# Patient Record
Sex: FEMALE | Race: WHITE | NOT HISPANIC OR LATINO | Employment: FULL TIME | URBAN - METROPOLITAN AREA
[De-identification: names, ages, dates, MRNs, and addresses within clinical notes are randomized per-mention and may not be internally consistent; named-entity substitution may affect disease eponyms.]

---

## 2018-03-29 DIAGNOSIS — O99.212 OBESITY AFFECTING PREGNANCY IN SECOND TRIMESTER: ICD-10-CM

## 2018-03-29 DIAGNOSIS — O09.899 HISTORY OF PRETERM DELIVERY, CURRENTLY PREGNANT: ICD-10-CM

## 2018-03-29 DIAGNOSIS — Z36.89 ENCOUNTER FOR FETAL ANATOMIC SURVEY: Primary | ICD-10-CM

## 2018-04-24 ENCOUNTER — OFFICE VISIT (OUTPATIENT)
Dept: MATERNAL FETAL MEDICINE | Facility: CLINIC | Age: 28
End: 2018-04-24
Payer: OTHER GOVERNMENT

## 2018-04-24 VITALS
BODY MASS INDEX: 45.99 KG/M2 | DIASTOLIC BLOOD PRESSURE: 86 MMHG | SYSTOLIC BLOOD PRESSURE: 132 MMHG | HEIGHT: 67 IN | WEIGHT: 293 LBS

## 2018-04-24 DIAGNOSIS — O09.212 PRIOR PRETERM LABOR IN SECOND TRIMESTER, ANTEPARTUM: ICD-10-CM

## 2018-04-24 DIAGNOSIS — O99.212 OBESITY AFFECTING PREGNANCY IN SECOND TRIMESTER: ICD-10-CM

## 2018-04-24 DIAGNOSIS — Z36.89 ENCOUNTER FOR FETAL ANATOMIC SURVEY: ICD-10-CM

## 2018-04-24 DIAGNOSIS — O09.899 HISTORY OF PRETERM DELIVERY, CURRENTLY PREGNANT: ICD-10-CM

## 2018-04-24 PROCEDURE — 76805 OB US >/= 14 WKS SNGL FETUS: CPT | Mod: ,,, | Performed by: OBSTETRICS & GYNECOLOGY

## 2018-04-24 PROCEDURE — 76817 TRANSVAGINAL US OBSTETRIC: CPT | Mod: ,,, | Performed by: OBSTETRICS & GYNECOLOGY

## 2018-04-24 PROCEDURE — 99204 OFFICE O/P NEW MOD 45 MIN: CPT | Mod: 25,,, | Performed by: OBSTETRICS & GYNECOLOGY

## 2018-04-24 NOTE — LETTER
April 24, 2018      Adelina Garcia CNM  [address] [facility]  [facility] [facility]  [place] [address]           [place] [facility]  [address], [address]  [place] [address]  Phone: [phone]          Patient: Sujey Rincon   MR Number: [id number]   YOB: 1990   Date of Visit: 4/24/2018       Dear Adelina Garcia:    Thank you for referring Sujey Rincon to me for evaluation. Attached you will find relevant portions of my assessment and plan of care.    If you have questions, please do not hesitate to call me. I look forward to following Sujey Rincon along with you.    Sincerely,    Gm So MD    Enclosure  CC:  No Recipients    If you would like to receive this communication electronically, please contact [email or web address] or [phone] to request more information on [employer] Link access.    For providers and/or their staff who would like to refer a patient to [facility], please contact us through our one-stop-shop provider referral line, [facility], at [phone].    If you feel you have received this communication in error or would no longer like to receive these types of communications, please e-mail [email or web address]

## 2018-04-24 NOTE — PROGRESS NOTES
Chief complaint: Prior  birth    Provider requesting consultation: LELO Garcia CNM-KAF[employer]    27 y.o. [id number] 16w0d EGA    PMH:  Past Medical History:   Diagnosis Date    Gastric ulcer     Hypertension     Obesity     PCOS (polycystic ovarian syndrome)     Thyroid disease        PObHx:  OB History    Para Term  AB Living   2 1 0 1 0 1   SAB TAB Ectopic Multiple Live Births   0 0 0 0 1      # Outcome Date GA Lbr Pravin/2nd Weight Sex Delivery Anes PTL Lv   2 Current            1  09/17/15 33w0d  2.693 kg (5 lb 15 oz) F CS-Unspec   DAVEY      Complications: PROM (premature rupture of membranes)          PSH:  Past Surgical History:   Procedure Laterality Date     SECTION         Family history:family history is not on file.    Social history: reports that she has never smoked. She has never used smokeless tobacco. She reports that she does not drink alcohol or use drugs.    A detailed fetal anatomical ultrasound was completed today.  See details in imaging section of EPIC.      Prior spontaneous  birth counseling and recommendations:    The patient gives a history of PPROM at 33 weeks with delivery by C/S 5 days later.   I discussed with the patient her prior history of  delivery.  I reviewed with her the increased risk of recurrence of  delivery in women who have a history of a prior spontaneous  birth.  I discussed current ACOG guidelines that recommend in patients with a prior history of spontaneous  birth they be offered progesterone therapy for the prevention of recurrent  birth.  This is usually accomplished via weekly 250mg intramuscular injections of 17-alpha-hydroxyprogesterone caproate beginning at 16-20 weeks estimated gestational age and continuing until 37 weeks.  (I anticipate this can be arranged by her primary obstetrical provider.)      Note is made of a family history (sister) of severe PP depression and psychosis that has  persisted years after delivery.  She was initially treated with progesterone for the 1st trimester 2/2 a history of multiple miscarriages.  For this reason, the patient is somewhat reticent of taking progesterone although she has already had a pregnancy with no history of PP depression.  I discussed this with the patient.  Progesterone is the hormone that is associated with mood depression, but her sister only took this in the  and it is likely the pregnancy and not the progesterone associated with her depressive psychosis.      In addition, her sister had a w/u for multiple miscarriages after which she was treated with Lovenox, raising the possibility of a genetic thrombophilia.  I have asked her to get a copy of that workup to determine whether there is an indication for a thrombophilia work up.  There is a strong family history on the maternal tere of early MI and strokes.      I also discussed with the patient that in women at risk for recurrent  birth, surveillance for cervical length shortening with periodic cervical length measurements may help identify those in need for tocolytic therapy and administration of corticosteroids.  Here at [facility] our MFM group recommends to measure the cervix every 2-3 weeks transvaginally starting at 16-18 weeks gestation up until 28 weeks gestation.     Recommendations from our MFM group at [facility]:    -Weekly progesterone injection therapy be initiated by her primary obstetrician at 16-20 weeks gestation until 37 weeks gestation    -Cervical length surveillance every 2-3 weeks starting at 16-18 weeks gestation up until 28 weeks gestation    With your permission we have taken the liberty today to schedule a follow up appointment for cervical length surveillance.       The patient was given an opportunity to ask questions about management and the diease process.  She expressed an understanding of and agreement to the above impression and plan. All questions  were answered to her satisfaction.  She was given contact information to the [facility] clinic to address further concerns.      The approximate physician face-to-face time was 45 minutes. The majority of the time (>50%) was spent on counseling of the patient or coordination of care.

## 2018-05-10 ENCOUNTER — OFFICE VISIT (OUTPATIENT)
Dept: MATERNAL FETAL MEDICINE | Facility: CLINIC | Age: 28
End: 2018-05-10
Payer: OTHER GOVERNMENT

## 2018-05-10 VITALS — DIASTOLIC BLOOD PRESSURE: 80 MMHG | SYSTOLIC BLOOD PRESSURE: 142 MMHG | BODY MASS INDEX: 46.83 KG/M2 | WEIGHT: 293 LBS

## 2018-05-10 DIAGNOSIS — O09.899 HISTORY OF PRETERM DELIVERY, CURRENTLY PREGNANT: ICD-10-CM

## 2018-05-10 PROCEDURE — 76817 TRANSVAGINAL US OBSTETRIC: CPT | Mod: ,,, | Performed by: OBSTETRICS & GYNECOLOGY

## 2018-05-10 PROCEDURE — 76815 OB US LIMITED FETUS(S): CPT | Mod: ,,, | Performed by: OBSTETRICS & GYNECOLOGY

## 2018-05-10 PROCEDURE — 99499 UNLISTED E&M SERVICE: CPT | Mod: ,,, | Performed by: OBSTETRICS & GYNECOLOGY

## 2018-05-10 NOTE — PROGRESS NOTES
TeleMed Only - [place]  IMANI 10/9/18; 18w2d    Dr. So's last note reviewed - it appears that pt may be reluctant to take the weekly 17-P because her sister was on 17-P and her sister  developed PP depression and Psychosis. Dr. So is waiting to receive her sister's medical records    OB HX   - Makenna; -15; C/S after PROM at 33 weeks.    Impression  =========  Normal cervical length,  Normal amniotic fluid volume.    Recommendation  ==============  Continue follow up scan as previously outlined.

## 2018-05-24 ENCOUNTER — OFFICE VISIT (OUTPATIENT)
Dept: MATERNAL FETAL MEDICINE | Facility: CLINIC | Age: 28
End: 2018-05-24
Payer: OTHER GOVERNMENT

## 2018-05-24 VITALS — WEIGHT: 293 LBS | SYSTOLIC BLOOD PRESSURE: 140 MMHG | DIASTOLIC BLOOD PRESSURE: 75 MMHG | BODY MASS INDEX: 47.46 KG/M2

## 2018-05-24 DIAGNOSIS — O99.212 OBESITY AFFECTING PREGNANCY IN SECOND TRIMESTER: ICD-10-CM

## 2018-05-24 DIAGNOSIS — O09.899 HISTORY OF PRETERM DELIVERY, CURRENTLY PREGNANT: ICD-10-CM

## 2018-05-24 PROCEDURE — 76811 OB US DETAILED SNGL FETUS: CPT | Mod: ,,, | Performed by: OBSTETRICS & GYNECOLOGY

## 2018-05-24 PROCEDURE — 99214 OFFICE O/P EST MOD 30 MIN: CPT | Mod: 25,,, | Performed by: OBSTETRICS & GYNECOLOGY

## 2018-05-24 PROCEDURE — 76817 TRANSVAGINAL US OBSTETRIC: CPT | Mod: ,,, | Performed by: OBSTETRICS & GYNECOLOGY

## 2018-05-24 RX ORDER — LABETALOL 100 MG/1
100 TABLET, FILM COATED ORAL 2 TIMES DAILY
COMMUNITY

## 2018-05-24 RX ORDER — HYDROXYPROGESTERONE CAPROATE 250 MG/ML
250 INJECTION INTRAMUSCULAR
COMMUNITY

## 2018-05-24 NOTE — PROGRESS NOTES
"Indication  ========    F/U Consultation. Fetal anatomy survey. History of incompetent cervix.    History  ======    General History  Blood group: B  Rhesus: Rh positive  Smoking: no  Height 170 cm  Height (ft) 5 ft  Height (in) 7 in  Medical History  Past surgical history: Previous surgeries performed  Surgery:  section  Previous Outcomes  Preg. no. 1  Outcome: Spontaneous miscarriage  Gest. age 4 w + 0 d  Details: 2008  Preg. no. 2  Outcome: Live YOB: 2017  Gest. age 33 w + 0 d  Gender: female  Details: PPROM 32wks c/s 5lb5oz "Makenna"   3  Para 1  Rubin children born (P) 1  Abortions (A) 1  Rubin living children (L) 1  Rubin children born living (P) 1  Miscarriages 1  Risk Factors  History risk factors:  delivery in previous pregnancy  Details: 33wks  History risk factors: Personal history of hypertension  History risk factors: Obesity  Details: Thyroid Disease  Details: Gastric Ulcer  Details: PCOS    Pregnancy History  ==============    Maternal Lab Tests  Result: integrated part 2 negative DSR 1:10,000 (age DSR 1:746).  Wants to know gender: yes    Maternal Assessment  =================    Weight 137 kg  Weight (lb) 303 lb  Height 170 cm  Height (ft) 5 ft  Height (in) 7 in  BP syst 140 mmHg  BP diast 75 mmHg  BMI 47.46 kg/m²    Method  ======    Transabdominal and transvaginal ultrasound examination, 2D Color Doppler, Will Epiq 7. View: Suboptimal view: limited by maternal body  habitus. Suboptimal view: limited by fetal position.    Pregnancy  =========    Rubin pregnancy. Number of fetuses: 1.    Dating  ======    Cycle: regular cycle  GA by "stated dating" 20 w + 2 d  IMANI by "stated dating": 10/9/2018  Ultrasound examination on: 2018  GA by U/S based upon: AC, BPD, Femur, HC  GA by U/S 19 w + 3 d  IMANI by U/S: 10/15/2018  Assigned: Dating performed on 2018, based on the external assessment  Assigned GA 20 w + 2 d  Assigned " IMANI: 10/9/2018    General Evaluation  ==============    Cardiac activity: present.  bpm.  Fetal movements: visualized.  Presentation: breech.  Placenta:  Placental site: anterior. Distance from internal cervical os 24 mm.  Umbilical cord: Cord vessels: 3 vessel cord. Cord insertion: placental insertion: suboptimal.  Amniotic fluid: normal amount.    Fetal Biometry  ============    Fetal Biometry  BPD 44.0 mm 19w 2d Hadlock  OFD 58.5 mm 20w 3d Kelton  .8 mm 19w 1d Hadlock  .0 mm 19w 6d Hadlock  Femur 30.7 mm 19w 4d Hadlock  Cerebellum tr 20.5 mm 20w 2d Pagan  CM 6.1 mm  Humerus 29.6 mm 19w 5d Kelton   g 13% Adolfo  Calculated by: Hadlock (BPD-HC-AC-FL)  EFW (lb) 0 lb  EFW (oz) 11 oz  Cephalic index 0.75  HC / AC 1.14  FL / BPD 0.70  FL / AC 0.21   bpm    Fetal Anatomy  ===========    Cranium: normal  Lateral ventricles: normal  Choroid plexus: normal  Midline falx: normal  Cavum septi pellucidi: normal  Cerebellum: normal  Cisterna magna: normal  Head shape: normal  Rt lateral ventricle: normal  Lt lateral ventricle: normal  Rt choroid plexus: normal  Lt choroid plexus: normal  Parenchyma: normal  Third ventricle: normal  Posterior fossa: normal  Cerebellar lobes: normal  Vermis: normal  Neck: suboptimal  Lips: suboptimal  Profile: normal  Nose: suboptimal  Maxilla: normal  Mandible: normal  4-chamber view: normal  RVOT: suboptimal  LVOT: normal  Heart / Thorax: Septal views: suboptimal  Situs: normal  Aortic arch: suboptimal  Ductal arch: normal  SVC: normal  IVC: normal  3-vessel view: suboptimal  3-vessel-trachea view: suboptimal  Cardiac position: normal  Cardiac axis: normal  Cardiac size: normal  Cardiac rhythm: normal  Rt lung: normal  Lt lung: normal  Diaphragm: suboptimal  Cord insertion: normal  Stomach: normal  Kidneys: normal  Bladder: normal  Genitals: normal  Abdom. wall: appears normal  Abdom. cavity: normal  Rt kidney: normal  Lt  kidney: normal  Liver: normal  Bowel: normal  Rt renal artery: suboptimal  Lt renal artery: suboptimal  Cervical spine: suboptimal  Thoracic spine: suboptimal  Lumbar spine: suboptimal  Sacral spine: suboptimal  Arms: normal  Legs: normal  Rt arm: normal  Lt arm: normal  Rt hand: present  Lt hand: present  Rt upper leg: suboptimal  Rt foot: suboptimal  Lt upper leg: normal  Lt lower leg: suboptimal  Lt foot: suboptimal  Position of hands: normal  Gender: male  Wants to know gender: yes    Maternal Structures  ===============    Uterus / Cervix  Uterus: Normal  Cervix: Visualized  Approach: Transvaginal  Cervical length 39.5 mm  Ovaries / Tubes / Adnexa  Rt ovary: Visualized  Lt ovary: Not visualized  Other: Uterus and adnexa normal    Consultation  ==========    Return MD-CHTN, prior PTD  140/75-started labetalol 100 mg BID by primary OB  Patient had several questions about re-locating to [place]. Discussed would not recommend flying after 26-28 weeks due to her   birth history. Recommend that she coordinate care through the base to establish with a high risk provider in the area. She will be ~ 20 minutes  outside of [place]. Wants to return if able in the third trimester. We discussed that would be up to her physicians caring for her in [place]. If  travels by car, should stop every 2 hours and walk around for 15 minutes due to VTE risk.  Discussed incomplete anatomy survey-will re-attempt in two weeks when returns for cervical length.  Having difficulty at work and requests work restrictions. Given her risk of  labor and new onset hypertension, recommend seated work,  limited hours if needed, and breaks every 2 hours.  CHTN-cont LDA, started on labetalol 100 mg BID, would recommend keeping blood pressure between 120-160/.  Wants BTL if delivers near term and baby appears well.  Time  I overall spent approximately 25 minutes in face to face time with the patient and her family, greater than  50% of which was in counseling and  care coordination.    Impression  =========    Rubin, living IUP in breech presentation.  Fetal biometry measures consistent with EDC.  The anatomy survey reveals no malformations but was suboptimal for the structures listed above.  The AFV is normal.  The placenta is posterior and not previa.  The cervical length is normal.    Recommendation  ==============    Recommend repeat cervical length in 2 weeks; if normal, no further cervical length measurements indicated.  Will repeat limited anatomy survey at that visit as patient is re-locating to [place].  Continue weekly 17P injections.  Patient should be written a note for work accommodations according to the new ACOG Committee Opinion regarding work in pregnancy. I  would recommend that she have breaks every 2 hours and the opportunity to perform her work seated.

## 2018-06-07 ENCOUNTER — OFFICE VISIT (OUTPATIENT)
Dept: MATERNAL FETAL MEDICINE | Facility: CLINIC | Age: 28
End: 2018-06-07
Payer: OTHER GOVERNMENT

## 2018-06-07 VITALS — WEIGHT: 293 LBS | BODY MASS INDEX: 47.46 KG/M2 | DIASTOLIC BLOOD PRESSURE: 62 MMHG | SYSTOLIC BLOOD PRESSURE: 135 MMHG

## 2018-06-07 DIAGNOSIS — O09.899 HISTORY OF PRETERM DELIVERY, CURRENTLY PREGNANT: ICD-10-CM

## 2018-06-07 PROCEDURE — 99499 UNLISTED E&M SERVICE: CPT | Mod: ,,, | Performed by: OBSTETRICS & GYNECOLOGY

## 2018-06-07 PROCEDURE — 76815 OB US LIMITED FETUS(S): CPT | Mod: ,,, | Performed by: OBSTETRICS & GYNECOLOGY

## 2018-06-07 PROCEDURE — 76817 TRANSVAGINAL US OBSTETRIC: CPT | Mod: ,,, | Performed by: OBSTETRICS & GYNECOLOGY

## 2018-08-22 ENCOUNTER — OFFICE VISIT (OUTPATIENT)
Dept: MATERNAL FETAL MEDICINE | Facility: CLINIC | Age: 28
End: 2018-08-22
Payer: OTHER GOVERNMENT

## 2018-08-22 VITALS — DIASTOLIC BLOOD PRESSURE: 69 MMHG | WEIGHT: 293 LBS | SYSTOLIC BLOOD PRESSURE: 110 MMHG | BODY MASS INDEX: 47.93 KG/M2

## 2018-08-22 DIAGNOSIS — Z36.89 ENCOUNTER FOR ULTRASOUND TO CHECK FETAL GROWTH: Primary | ICD-10-CM

## 2018-08-22 PROCEDURE — 76816 OB US FOLLOW-UP PER FETUS: CPT | Mod: ,,, | Performed by: OBSTETRICS & GYNECOLOGY

## 2018-08-22 PROCEDURE — 99499 UNLISTED E&M SERVICE: CPT | Mod: ,,, | Performed by: OBSTETRICS & GYNECOLOGY

## 2018-08-22 PROCEDURE — 76819 FETAL BIOPHYS PROFIL W/O NST: CPT | Mod: ,,, | Performed by: OBSTETRICS & GYNECOLOGY

## 2018-09-18 ENCOUNTER — PROCEDURE VISIT (OUTPATIENT)
Dept: MATERNAL FETAL MEDICINE | Facility: CLINIC | Age: 28
End: 2018-09-18
Payer: OTHER GOVERNMENT

## 2018-09-18 VITALS — SYSTOLIC BLOOD PRESSURE: 147 MMHG | BODY MASS INDEX: 48.87 KG/M2 | WEIGHT: 293 LBS | DIASTOLIC BLOOD PRESSURE: 78 MMHG

## 2018-09-18 DIAGNOSIS — Z36.89 ENCOUNTER FOR ULTRASOUND TO CHECK FETAL GROWTH: ICD-10-CM

## 2018-09-18 DIAGNOSIS — O99.213 OBESITY COMPLICATING PREGNANCY, THIRD TRIMESTER: ICD-10-CM

## 2018-09-18 PROCEDURE — 76816 OB US FOLLOW-UP PER FETUS: CPT | Mod: ,,, | Performed by: OBSTETRICS & GYNECOLOGY

## 2018-09-18 PROCEDURE — 99499 UNLISTED E&M SERVICE: CPT | Mod: ,,, | Performed by: OBSTETRICS & GYNECOLOGY

## 2018-09-18 PROCEDURE — 76819 FETAL BIOPHYS PROFIL W/O NST: CPT | Mod: ,,, | Performed by: OBSTETRICS & GYNECOLOGY

## 2018-09-18 NOTE — PROGRESS NOTES
"Indication  ========    Evaluation of fetal growth. HX PTD, HTN.    History  ======    General History  Blood group: B  Rhesus: Rh positive  Smoking: no  Height 170 cm  Height (ft) 5 ft  Height (in) 7 in  Medical History  Past surgical history: Previous surgeries performed  Surgery:  section  Previous Outcomes  Preg. no. 1  Outcome: Spontaneous miscarriage  Gest. age 4 w + 0 d  Details: 2008  Preg. no. 2  Outcome: Live YOB: 2017  Gest. age 33 w + 0 d  Gender: female  Details: PPROM 32wks c/s 5lb5oz "Makenna"   3  Para 1  Rubin children born (P) 1  Abortions (A) 1  Rubin living children (L) 1  Rubin children born living (P) 1  Miscarriages 1  Risk Factors  History risk factors:  delivery in previous pregnancy  Details: 33wks  History risk factors: Personal history of hypertension  History risk factors: Obesity  Details: Thyroid Disease  Details: Gastric Ulcer  Details: PCOS    Pregnancy History  ==============    Maternal Lab Tests  Result: integrated part 2 negative DSR 1:10,000 (age DSR 1:746).  Wants to know gender: yes    Maternal Assessment  =================    Weight 142 kg  Weight (lb) 312 lb  Height 170 cm  Height (ft) 5 ft  Height (in) 7 in  BP syst 147 mmHg  BP diast 78 mmHg  BMI 48.87 kg/m²    Method  ======    Transabdominal ultrasound examination, 2D Color Doppler, Will Epiq 7. View: Suboptimal view: limited by maternal body habitus.    Pregnancy  =========    Rubin pregnancy. Number of fetuses: 1.    Dating  ======    Cycle: regular cycle  GA by "stated dating" 37 w + 0 d  IMANI by "stated dating": 10/9/2018  Ultrasound examination on: 2018  GA by U/S based upon: AC, BPD, Femur, HC  GA by U/S 36 w + 4 d  IMANI by U/S: 10/12/2018  Assigned: Dating performed on 2018, based on the external assessment  Assigned GA 37 w + 0 d  Assigned IMANI: 10/9/2018    General Evaluation  ==============    Cardiac activity: present.  bpm.  Fetal movements: " visualized.  Presentation: cephalic.  Placenta:  Placental site: anterior.  Umbilical cord: Cord vessels: 3 vessel cord.  Amniotic fluid: Amount of AF: normal amount. MVP 5.2 cm.    Biophysical Profile  ==============    2: Fetal breathing movements  2: Gross body movements  2: Fetal tone  2: Amniotic fluid volume  8/8: Biophysical profile score    Fetal Biometry  ============    Fetal Biometry  BPD 90.0 mm 36w 3d Hadlock  .8 mm 37w 6d Kelton  .9 mm 36w 5d Hadlock  .4 mm 37w 2d Hadlock  Femur 70.4 mm 36w 1d Hadlock  Humerus 60.1 mm 34w 6d Kelton  EFW 3,037 g 43% Adolfo  Calculated by: Hadlock (BPD-HC-AC-FL)  EFW (lb) 6 lb  EFW (oz) 11 oz  Cephalic index 0.81  HC / AC 0.97  FL / BPD 0.78  FL / AC 0.21  MVP 5.2 cm   bpm    Fetal Anatomy  ===========    Cranium: normal  Lateral ventricles: normal  Posterior fossa: documented previously  4-chamber view: normal  Stomach: normal  Kidneys: normal  Bladder: normal  Gender: male  Wants to know gender: yes  Other: A full anatomy survey previously performed.    Impression  =========    Fetal size is AGA with the EFW at the 43rd percentile.  Normal repeat limited fetal anatomic survey. AFV is normal.  The BPP score is reassuring at 8/8, and the MVP is normal.    Recommendation  ==============    Repeat ultrasound study as clinically indicated  Patient is scheduled for delivery next week.  Thank you again for allowing us to participate in the care of your patients. If you have any questions concerning today's consultation, feel free  to contact me or one of my partners. We can be reached at [phone] during normal business hours. If you have a question after normal  business hours, please contact Labor and Delivery at [phone].

## 2019-09-17 ENCOUNTER — IMPORTED ENCOUNTER (OUTPATIENT)
Dept: URBAN - METROPOLITAN AREA CLINIC 38 | Facility: CLINIC | Age: 29
End: 2019-09-17

## 2019-09-17 PROBLEM — H40.013 OPEN ANGLE WITH BORDERLINE FINDINGS, LOW RISK, BILATERAL: Noted: 2019-09-17

## 2019-09-17 PROBLEM — H52.13 MYOPIA, BILATERAL: Noted: 2019-09-17

## 2019-09-17 PROCEDURE — 76514 ECHO EXAM OF EYE THICKNESS: CPT

## 2019-09-17 PROCEDURE — 92015 DETERMINE REFRACTIVE STATE: CPT

## 2019-09-17 PROCEDURE — 92004 COMPRE OPH EXAM NEW PT 1/>: CPT

## 2022-07-02 ASSESSMENT — KERATOMETRY
OS_AXISANGLE2_DEGREES: 104
OS_K1POWER_DIOPTERS: 44.00
OD_K1POWER_DIOPTERS: 43.75
OS_AXISANGLE_DEGREES: 14
OD_K2POWER_DIOPTERS: 44.50
OS_K2POWER_DIOPTERS: 44.25
OD_AXISANGLE2_DEGREES: 74
OD_AXISANGLE_DEGREES: 164

## 2022-07-02 ASSESSMENT — VISUAL ACUITY
OD_SC: J1
OS_SC: J1
OS_SC: 20/20-1
OD_SC: 20/20-1

## 2022-07-02 ASSESSMENT — TONOMETRY
OS_IOP_MMHG: 19
OD_IOP_MMHG: 23